# Patient Record
Sex: MALE | Race: WHITE | Employment: FULL TIME | URBAN - NONMETROPOLITAN AREA
[De-identification: names, ages, dates, MRNs, and addresses within clinical notes are randomized per-mention and may not be internally consistent; named-entity substitution may affect disease eponyms.]

---

## 2020-12-31 ENCOUNTER — TELEPHONE (OUTPATIENT)
Dept: FAMILY MEDICINE | Facility: OTHER | Age: 35
End: 2020-12-31

## 2020-12-31 ENCOUNTER — OFFICE VISIT (OUTPATIENT)
Dept: FAMILY MEDICINE | Facility: OTHER | Age: 35
End: 2020-12-31
Attending: PHYSICIAN ASSISTANT
Payer: COMMERCIAL

## 2020-12-31 VITALS
HEART RATE: 91 BPM | DIASTOLIC BLOOD PRESSURE: 80 MMHG | RESPIRATION RATE: 24 BRPM | SYSTOLIC BLOOD PRESSURE: 130 MMHG | WEIGHT: 315 LBS | OXYGEN SATURATION: 78 % | BODY MASS INDEX: 46.65 KG/M2 | HEIGHT: 69 IN | TEMPERATURE: 97.7 F

## 2020-12-31 DIAGNOSIS — F19.11 HISTORY OF SUBSTANCE ABUSE (H): ICD-10-CM

## 2020-12-31 DIAGNOSIS — E11.69 TYPE 2 DIABETES MELLITUS WITH OTHER SPECIFIED COMPLICATION, WITHOUT LONG-TERM CURRENT USE OF INSULIN (H): ICD-10-CM

## 2020-12-31 DIAGNOSIS — Z79.899 ENCOUNTER FOR MEDICATION MANAGEMENT: Primary | ICD-10-CM

## 2020-12-31 DIAGNOSIS — J45.20 MILD INTERMITTENT ASTHMA WITHOUT COMPLICATION: ICD-10-CM

## 2020-12-31 DIAGNOSIS — E66.01 MORBID OBESITY (H): ICD-10-CM

## 2020-12-31 DIAGNOSIS — I10 BENIGN ESSENTIAL HYPERTENSION: ICD-10-CM

## 2020-12-31 PROCEDURE — 99203 OFFICE O/P NEW LOW 30 MIN: CPT | Performed by: PHYSICIAN ASSISTANT

## 2020-12-31 RX ORDER — BUMETANIDE 1 MG/1
1 TABLET ORAL 2 TIMES DAILY
COMMUNITY
Start: 2020-11-27 | End: 2020-12-31

## 2020-12-31 RX ORDER — ALBUTEROL SULFATE 90 UG/1
1 AEROSOL, METERED RESPIRATORY (INHALATION) EVERY 6 HOURS PRN
COMMUNITY
Start: 2020-11-09 | End: 2020-12-31

## 2020-12-31 RX ORDER — FUROSEMIDE 40 MG
40 TABLET ORAL 2 TIMES DAILY
Qty: 90 TABLET | Refills: 3 | Status: SHIPPED | OUTPATIENT
Start: 2020-12-31

## 2020-12-31 RX ORDER — NIFEDIPINE 30 MG
30 TABLET, EXTENDED RELEASE ORAL DAILY
Qty: 90 TABLET | Refills: 1 | Status: SHIPPED | OUTPATIENT
Start: 2020-12-31 | End: 2020-12-31

## 2020-12-31 RX ORDER — NIFEDIPINE 30 MG
30 TABLET, EXTENDED RELEASE ORAL DAILY
Qty: 90 TABLET | Refills: 1 | Status: SHIPPED | OUTPATIENT
Start: 2020-12-31

## 2020-12-31 RX ORDER — NIFEDIPINE 30 MG
30 TABLET, EXTENDED RELEASE ORAL DAILY
COMMUNITY
End: 2020-12-31

## 2020-12-31 RX ORDER — FUROSEMIDE 40 MG
40 TABLET ORAL 2 TIMES DAILY
Qty: 90 TABLET | Refills: 3 | Status: SHIPPED | OUTPATIENT
Start: 2020-12-31 | End: 2020-12-31

## 2020-12-31 RX ORDER — FUROSEMIDE 20 MG
40 TABLET ORAL 2 TIMES DAILY
COMMUNITY
Start: 2020-11-09 | End: 2020-12-31

## 2020-12-31 RX ORDER — ALBUTEROL SULFATE 90 UG/1
1 AEROSOL, METERED RESPIRATORY (INHALATION) EVERY 6 HOURS PRN
Qty: 2 INHALER | Refills: 11 | Status: SHIPPED | OUTPATIENT
Start: 2020-12-31

## 2020-12-31 RX ORDER — BUMETANIDE 1 MG/1
1 TABLET ORAL 2 TIMES DAILY
Qty: 90 TABLET | Refills: 3 | Status: SHIPPED | OUTPATIENT
Start: 2020-12-31

## 2020-12-31 RX ORDER — LOSARTAN POTASSIUM 25 MG/1
25 TABLET ORAL DAILY
Qty: 90 TABLET | Refills: 3 | Status: SHIPPED | OUTPATIENT
Start: 2020-12-31

## 2020-12-31 ASSESSMENT — PATIENT HEALTH QUESTIONNAIRE - PHQ9: SUM OF ALL RESPONSES TO PHQ QUESTIONS 1-9: 0

## 2020-12-31 ASSESSMENT — PAIN SCALES - GENERAL: PAINLEVEL: NO PAIN (0)

## 2020-12-31 ASSESSMENT — MIFFLIN-ST. JEOR: SCORE: 3410.16

## 2020-12-31 NOTE — TELEPHONE ENCOUNTER
Patient said that [facility] didn't get losartan, lasix and albuterol. He is verifying that he should be taking these medications and if so send to [facility] . Saumya Saldana LPN ....................12/31/2020  1:08 PM

## 2020-12-31 NOTE — NURSING NOTE
"Chief Complaint   Patient presents with     Establish Care     medication management     Patient is new to the area and needs medications refilled.   Initial /80 (BP Location: Right arm, Patient Position: Sitting, Cuff Size: Adult Large)   Pulse 91   Temp 97.7  F (36.5  C) (Temporal)   Resp 24   Ht 1.753 m (5' 9\")   Wt (!) 249.5 kg (550 lb)   SpO2 (!) 78%   BMI 81.22 kg/m   Estimated body mass index is 81.22 kg/m  as calculated from the following:    Height as of this encounter: 1.753 m (5' 9\").    Weight as of this encounter: 249.5 kg (550 lb).  Medication Reconciliation: complete    Saumya Saldana LPN  "

## 2020-12-31 NOTE — TELEPHONE ENCOUNTER
Patient states possibly only some of his medications were sent to the pharmacy    He would like to verify what he should be taking    Please call to advise    Thank you

## 2020-12-31 NOTE — TELEPHONE ENCOUNTER
Talked to patient and he did get the medications he needed. Saumya Still LPN ....................12/31/2020  4:46 PM

## 2020-12-31 NOTE — PROGRESS NOTES
SUBJECTIVE:   JENNY Overton is a 35 year old male here for medication review.    Patient works for the [employer] and is released from [place].  He has a history of morbid obesity, type 2 diabetes, hypertension, possible fatty liver disease, and mild intermittent asthma.  Medications include albuterol inhaler, 40 mg Lasix twice a day, 500 mg metoprolol twice a day, and nifedipine ER 30 mg.     Patient needs refills of his medications.  He states he is not looking for a primary care provider and thinks he is going back to [place] in 1 to 2 weeks.  He states he had a full history and physical exam with lab work approximately 6 weeks ago.  He states his A1c was at 6.5%, thyroid function was normal, liver and kidney function were normal.  He states he was considered to have gastric bypass surgery roughly 1 year ago however due to financial constraints he was unable to go through with this surgery.  He still interested in weight loss and is pursuing gastric bypass surgery back in [place].    He is wondering if he should increase his Metformin.  He states he checks his blood sugar daily in the mornings is usually 120-130, 80-90 at lunch, and roughly 100 at dinnertime.    He wonders if he should increase his Lasix as his ankles are more swollen than usual and he has noticed his abdominal girth is slightly greater.    He has a history of substance abuse and is 7 years sober.  He did not clarify what substance but stated it involved snorting.    No other concerns or questions for today's visit.    GAD7  No flowsheet data found.    PHQ9  PHQ 12/31/2020   PHQ-9 Total Score 0   Q9: Thoughts of better off dead/self-harm past 2 weeks Not at all       Allergies:  No Known Allergies    Review of Systems   As above otherwise ROS is unremarkable.     OBJECTIVE:     Vitals:    12/31/20 1109   BP: 130/80   BP Location: Right arm   Patient Position: Sitting   Cuff Size: Adult Large   Pulse: 91   Resp: 24   Temp: 97.7  F (36.5  C)  "  TempSrc: Temporal   SpO2: (!) 78%   Weight: (!) 249.5 kg (550 lb)   Height: 1.753 m (5' 9\")       Physical Exam  General Appearance: Morbidly obese, Pleasant, alert, appropriate appearance for age and circumstances, no acute distress  Head: Normocephalic, atraumatic  Eyes: PERRL, EOMI  Ears: TM's pearly gray and intact bilaterally. Normal auditory canals and external ears   OroPharynx: Dental hygiene adequate. Normal buccal mucosa. Normal pharynx. No exudates or petechia noted.  Neck: Supple, no masses or lymphadenopathy. Thyroid smooth and rubbery in texture without palpable nodules  Lungs: Normal chest wall and respirations. Clear to auscultation, no wheezes, rales, rhonchi, or stridor  Cardiovascular: Regular rate and rhythm. S1 and S2 audible, no murmurs, clicks, rubs, or gallops  Gastrointestinal: Abd symmetrical, soft, nontender, no masses, guarding, or tympany, normoactive bowel sounds  Musculoskeletal: No discernable muscle atrophy or weakness; full joint range of motion, no instability, redness, swelling, or tenderness; no discernable spine deviation or gait abnormalities  Skin: 3+ pitting pedal edema bilaterally.  No concerning or new rashes  Vascular: Radial, posterior tibial, and dorsalis pedis pulses present bilaterally and not bounding or thready  Neurologic Exam: CN 2-12 grossly intact.  Normal gait.  Symmetric DTRs, no focal motor or sensory deficits. No tremor.  Psychiatric Exam: Alert and oriented, appropriate affect    ASSESSMENT/PLAN:       ICD-10-CM    1. Encounter for medication management  Z79.899    2. Morbid obesity (H)  E66.01 furosemide (LASIX) 40 MG tablet     metFORMIN (GLUCOPHAGE) 1000 MG tablet     DISCONTINUED: furosemide (LASIX) 40 MG tablet     DISCONTINUED: metFORMIN (GLUCOPHAGE) 1000 MG tablet     DISCONTINUED: furosemide (LASIX) 40 MG tablet     DISCONTINUED: metFORMIN (GLUCOPHAGE) 1000 MG tablet   3. Type 2 diabetes mellitus with other specified complication, without long-term " current use of insulin (H)  E11.69 metFORMIN (GLUCOPHAGE) 1000 MG tablet     DISCONTINUED: metFORMIN (GLUCOPHAGE) 1000 MG tablet     DISCONTINUED: metFORMIN (GLUCOPHAGE) 1000 MG tablet   4. Benign essential hypertension  I10 furosemide (LASIX) 40 MG tablet     NIFEdipine ER (ADALAT CC) 30 MG 24 hr tablet     DISCONTINUED: NIFEdipine ER (ADALAT CC) 30 MG 24 hr tablet     DISCONTINUED: furosemide (LASIX) 40 MG tablet     DISCONTINUED: furosemide (LASIX) 40 MG tablet     DISCONTINUED: NIFEdipine ER (ADALAT CC) 30 MG 24 hr tablet   5. Mild intermittent asthma without complication  J45.20    6. History of substance abuse (H)  F19.11        Refilled patient's medications.    No lab work today as per patient he recently had lab work and these were either acceptable or within normal limits for liver, kidney, thyroid, and diabetes.    He has already been worked up and approved for gastric bypass surgery and is currently pursuing this option.  If patient were to be stationed here for an extended period time I did discuss with him weight management counseling and referral.  He denied referral at this time as he believes he will be transferred in the next week or 2.  We also discussed healthy diet and exercise and he demonstrated good understanding of a heart healthy diet for type 2 diabetes.    Due to increased pedal edema I recommend increasing his Lasix to 80 mg twice a day.    Due to slight elevations in his blood glucose level Wells obesity in the mornings I recommend he increase his Metformin from 500 mg to 1000 mg twice a day.    Patient will follow up for the development of new, ongoing, or worse symptoms.    No orders of the defined types were placed in this encounter.    No follow-ups on file.    ARIELLE Martinez  [facility]    This document was prepared using voice generated software.  While every attempt was made for accuracy, grammatical errors may exist.